# Patient Record
Sex: FEMALE | Race: BLACK OR AFRICAN AMERICAN | NOT HISPANIC OR LATINO | Employment: FULL TIME | URBAN - METROPOLITAN AREA
[De-identification: names, ages, dates, MRNs, and addresses within clinical notes are randomized per-mention and may not be internally consistent; named-entity substitution may affect disease eponyms.]

---

## 2019-10-30 ENCOUNTER — HOSPITAL ENCOUNTER (OUTPATIENT)
Dept: RADIOLOGY | Facility: HOSPITAL | Age: 34
Discharge: HOME | End: 2019-10-30
Attending: FAMILY MEDICINE
Payer: COMMERCIAL

## 2019-10-30 DIAGNOSIS — I26.99 PULMONARY EMBOLISM: ICD-10-CM

## 2019-10-30 PROCEDURE — 25500020 PHARM REV CODE 255: Mod: PO | Performed by: FAMILY MEDICINE

## 2019-10-30 PROCEDURE — 71275 CT ANGIOGRAPHY CHEST: CPT | Mod: 26,,, | Performed by: RADIOLOGY

## 2019-10-30 PROCEDURE — 71275 CT ANGIOGRAPHY CHEST: CPT | Mod: TC,PO

## 2019-10-30 PROCEDURE — 71275 CTA CHEST NON CORONARY: ICD-10-PCS | Mod: 26,,, | Performed by: RADIOLOGY

## 2019-10-30 RX ADMIN — IOHEXOL 100 ML: 350 INJECTION, SOLUTION INTRAVENOUS at 08:10

## 2019-11-08 ENCOUNTER — INITIAL CONSULT (OUTPATIENT)
Dept: HEMATOLOGY/ONCOLOGY | Facility: CLINIC | Age: 34
End: 2019-11-08
Payer: COMMERCIAL

## 2019-11-08 VITALS
OXYGEN SATURATION: 98 % | TEMPERATURE: 98 F | DIASTOLIC BLOOD PRESSURE: 92 MMHG | HEART RATE: 83 BPM | SYSTOLIC BLOOD PRESSURE: 134 MMHG | HEIGHT: 65 IN | BODY MASS INDEX: 46.51 KG/M2 | WEIGHT: 279.13 LBS

## 2019-11-08 DIAGNOSIS — R56.9 SEIZURE: ICD-10-CM

## 2019-11-08 DIAGNOSIS — I26.99 OTHER ACUTE PULMONARY EMBOLISM WITHOUT ACUTE COR PULMONALE: Primary | ICD-10-CM

## 2019-11-08 PROCEDURE — 3008F PR BODY MASS INDEX (BMI) DOCUMENTED: ICD-10-PCS | Mod: CPTII,S$GLB,, | Performed by: INTERNAL MEDICINE

## 2019-11-08 PROCEDURE — 3008F BODY MASS INDEX DOCD: CPT | Mod: CPTII,S$GLB,, | Performed by: INTERNAL MEDICINE

## 2019-11-08 PROCEDURE — 99204 OFFICE O/P NEW MOD 45 MIN: CPT | Mod: S$GLB,,, | Performed by: INTERNAL MEDICINE

## 2019-11-08 PROCEDURE — 99204 PR OFFICE/OUTPT VISIT, NEW, LEVL IV, 45-59 MIN: ICD-10-PCS | Mod: S$GLB,,, | Performed by: INTERNAL MEDICINE

## 2019-11-08 PROCEDURE — 99999 PR PBB SHADOW E&M-EST. PATIENT-LVL III: CPT | Mod: PBBFAC,,, | Performed by: INTERNAL MEDICINE

## 2019-11-08 PROCEDURE — 99999 PR PBB SHADOW E&M-EST. PATIENT-LVL III: ICD-10-PCS | Mod: PBBFAC,,, | Performed by: INTERNAL MEDICINE

## 2019-11-08 RX ORDER — GLUCOSAMINE/CHONDRO SU A 500-400 MG
1 TABLET ORAL DAILY
COMMUNITY
End: 2020-04-17

## 2019-11-08 RX ORDER — ERGOCALCIFEROL 1.25 MG/1
50000 CAPSULE ORAL
COMMUNITY
End: 2022-01-13 | Stop reason: SDUPTHER

## 2019-11-08 RX ORDER — METOPROLOL SUCCINATE 25 MG/1
25 TABLET, EXTENDED RELEASE ORAL DAILY
COMMUNITY
End: 2020-04-17

## 2019-11-08 RX ORDER — EPINEPHRINE 0.22MG
100 AEROSOL WITH ADAPTER (ML) INHALATION DAILY
COMMUNITY

## 2019-11-08 RX ORDER — LOSARTAN POTASSIUM AND HYDROCHLOROTHIAZIDE 12.5; 5 MG/1; MG/1
1 TABLET ORAL DAILY
COMMUNITY
End: 2020-01-03 | Stop reason: SDUPTHER

## 2019-11-08 RX ORDER — ZOLPIDEM TARTRATE 10 MG/1
5 TABLET ORAL NIGHTLY PRN
COMMUNITY
End: 2020-04-17

## 2019-11-08 RX ORDER — ALPHA LIPOIC ACID 100 MG
CAPSULE ORAL 2 TIMES DAILY
COMMUNITY

## 2019-11-08 NOTE — LETTER
November 9, 2019      Jaren Tenorio MD  [address] [facility] [place] [address]           [facility]  [address] [facility] [address]  Phone: [phone]  Fax: [phone]          Patient: Alaina Soria   MR Number: [id number]   YOB: 1985   Date of Visit: 11/8/2019       Dear Dr. Jaren Tenorio:    Thank you for referring Alaina Soria to me for evaluation. Attached you will find relevant portions of my assessment and plan of care.    If you have questions, please do not hesitate to call me. I look forward to following Alaina Soria along with you.    Sincerely,    Wendi Alvarez MD    Enclosure  CC:  No Recipients    If you would like to receive this communication electronically, please contact [email or web address] or [phone] to request more information on [facility] access.    For providers and/or their staff who would like to refer a patient to [facility], please contact us through our one-stop-shop provider referral line, [facility], at [phone].    If you feel you have received this communication in error or would no longer like to receive these types of communications, please e-mail [email or web address]

## 2019-11-08 NOTE — PROGRESS NOTES
Subjective:      DATE OF VISIT: 11/8/19     ?  Patient ID:?Alaina Soria is a 34 y.o. female.?? MR#: [id number]   ?   REFERRING PROVIDER: Jaren Tenorio MD  [address] [place] [address]     ? Primary Care Providers:  Jaren Tenorio MD, MD (General)     CHIEF COMPLAINT: ?  Pulmonary embolism??   ?   HPI    I had the pleasure of meeting Ms. Soria, a 34-year-old woman with hypertension and obesity who presents for further evaluation recommendations after seizure and finding of pulmonary embolism.  She was in her usual state of health until 04/02/2019 with seizure and subsequent fall and facial injury; she presented to emergency room.  She did note shortness of breath on presentation however oxygen saturation within normal limits.  She was discharged however on way out of emergency room she developed a 2nd seizure event.  Due to shortness of breath CT a was performed which revealed small thrombus in segmental right upper lobe pulmonary artery.  She subsequently had DVT scan on 04/03/2019 which was negative.  She has had follow-up with Neurology and discussed likely provoked seizure due to Wellbutrin use which has since been discontinued. She was anticoagulated with Lovenox injection and then transition to apixaban.  Since then she has been continued on apixaban 5 mg b.i.d. without bleeding or bruising issues.  She notes resolution of acute shortness of breath although she does have baseline mild dyspnea on exertion.  She had been on NuvaRing at time of pulmonary embolism and has discontinued without further hormonal use.  She is working on weight loss. She is a never smoker.  She denies history proximal surgery/prolonged immobility prior to event.  She is considering another pregnancy in the next year.    She had had a repeat CTA chest on 11/01/2019 which was technically limited due to body habitus and motion artifact although no remaining pulmonary embolism was seen.    Review  of Systems    ?   A comprehensive 14-point review of systems was reviewed with patient and was negative other than as specified above.   ?   PAST MEDICAL HISTORY:   History reviewed. No pertinent past medical history. ?     PAST SURGICAL HISTORY:   History reviewed. No pertinent surgical history.   ?   ALLERGIES:   Allergies as of 11/08/2019    (No Known Allergies)      ?   MEDICATIONS:?   Outpatient Medications Marked as Taking for the 11/8/19 encounter (Initial consult) with Wendi Alvarez MD   Medication Sig Dispense Refill    alpha lipoic acid 100 mg Cap Take by mouth 2 (two) times daily.      apixaban (ELIQUIS) 5 mg Tab Take 5 mg by mouth 2 (two) times daily.      coenzyme Q10 (CO Q-10) 100 mg capsule Take 100 mg by mouth once daily.      ergocalciferol (ERGOCALCIFEROL) 50,000 unit Cap Take 50,000 Units by mouth every 7 days.      glucosamine-chondroitin 500-400 mg tablet Take 1 tablet by mouth once daily.      losartan-hydrochlorothiazide 50-12.5 mg (HYZAAR) 50-12.5 mg per tablet Take 1 tablet by mouth once daily.      metoprolol succinate (TOPROL XL) 25 MG 24 hr tablet Take 25 mg by mouth once daily.      multivitamin capsule Take 1 capsule by mouth once daily.      vit C/E/zinc/lutein/zeaxanthin (OCUVITE EYE HEALTH ORAL) Take by mouth.      zolpidem (AMBIEN) 10 mg Tab Take 5 mg by mouth nightly as needed.        ?   SOCIAL HISTORY:?   Social History     Tobacco Use    Smoking status: Never Smoker   Substance Use Topics    Alcohol use: Yes     Frequency: 2-4 times a month     Drinks per session: 1 or 2     Binge frequency: Never      ?      ?   FAMILY HISTORY:   family history is not on file.   ?   No family history of thrombosis.  Mother with rheumatoid arthritis, scleroderma and mixed connective tissue disease.      Objective:      Physical Exam      ?   Vitals:    11/08/19 1019   BP: (!) 134/92   Pulse: 83   Temp: 98.3 °F (36.8 °C)      ?   ECOG:?0   General appearance: Generally well  appearing, in no acute distress.   Head, eyes, ears, nose, and throat: Oropharynx clear with moist mucous membranes.   Cardiovascular: Regular rate and rhythm, S1, S2, no audible murmurs.   Respiratory: Lungs clear to auscultation bilaterally.   Abdomen: soft, nontender, nondistended.  Extremities: Warm, without edema.   Neurologic: Alert and oriented. Grossly normal strength, coordination, and gait.   Skin: No rashes, ecchymoses or petechial lesion.   ?      ?   Laboratory:  ?       IMAGING    4/2/19 Chest CTA    Indication: 33-year-old woman with dyspnea and syncope/seizure.    Technique: Thin slice helical CT imaging was performed from above the lung apices through the diaphragm following the administration of 85 mL Omnipaque 350 low osmolar intravenous contrast with coronal and sagittal reformatted images generated from the axial dataset per routine chest CTA protocol. Automated exposure control and/or weight adjusted ma/kV utilized per standard protocol to achieve as low as reasonable radiation dose.    Comparison: None.    Findings:    The main pulmonary arteries and segmental pulmonary arteries are well opacified with contrast with the subsegmental pulmonary arteries suboptimally opacified. There is focal filling defect within a single right upper lobe segmental pulmonary artery, well demonstrated on the coronal MIP series consistent with a small volume of pulmonary thromboembolus. No additional clot is identified within the pulmonary arteries. No findings of right heart strain.. The heart and great vessels are normal in size without pericardial effusion. No coronary artery atherosclerotic calcification is identified. The lungs are well expanded and clear without pulmonary or pleural abnormality. The large central airways are widely patent without filling defect or bronchiectasis. There is no intrathoracic or axillary adenopathy or mass. No significant abnormality at the thoracic inlet. Obesity without acute  chest wall abnormality. Limited arterial phase imaging through the upper abdomen demonstrates no acute pathology. No significant skeletal abnormality.    Impression:    Small volume of thrombus at a single segmental right upper lobe pulmonary artery.  No additional pulmonary thromboembolus or acute pathology identified at the chest.  ?   Assessment/Plan:       1. Other acute pulmonary embolism without acute cor pulmonale          Plan:     # acute pulmonary embolism:  Small thrombus and segmental right upper lobe pulmonary artery on 04/01/2019.  No concomitant DVT.  I discussed with her provoking factor of hormonal use at the time as well as obesity.  As this is her 1st thrombotic event which is provoked 3-6 month course of anticoagulation should be sufficient.  She has now completed 6 months of apixaban which he tolerated well without bleeding complication.  Another provider had ordered a repeat CTA on 11/01/2019 which I reviewed; this was limited with body habitus and motion artifacts however no thrombus was detected.  I have recommended risk factor reduction.  She has discontinued hormones and is working on weight loss. She is a never smoker. She is interested in future pregnancy and I discuss this is a high estrogen state which would put her at risk for recurrent thrombosis.  I recommended inherited thrombophilia testing which may help guide future anticoagulation prophylaxis needs especially in the setting of a future pregnancy.    # seizure:  Unclear if related to pulmonary embolism.  Recommended follow-up neurology.  [place] to be provoked by Wellbutrin which she has discontinued.        Follow-Up:   Three weeks for labs and then 3 weeks later return visit to discuss results of inherited thrombophilia testing.

## 2019-11-29 ENCOUNTER — LAB VISIT (OUTPATIENT)
Dept: LAB | Facility: HOSPITAL | Age: 34
End: 2019-11-29
Attending: INTERNAL MEDICINE
Payer: COMMERCIAL

## 2019-11-29 DIAGNOSIS — I26.99 OTHER ACUTE PULMONARY EMBOLISM WITHOUT ACUTE COR PULMONALE: ICD-10-CM

## 2019-11-29 PROCEDURE — 86146 BETA-2 GLYCOPROTEIN ANTIBODY: CPT

## 2019-11-29 PROCEDURE — 85305 CLOT INHIBIT PROT S TOTAL: CPT

## 2019-11-29 PROCEDURE — 85302 CLOT INHIBIT PROT C ANTIGEN: CPT

## 2019-11-29 PROCEDURE — 85300 ANTITHROMBIN III ACTIVITY: CPT

## 2019-11-29 PROCEDURE — 85613 RUSSELL VIPER VENOM DILUTED: CPT

## 2019-11-29 PROCEDURE — 36415 COLL VENOUS BLD VENIPUNCTURE: CPT

## 2019-11-29 PROCEDURE — 81240 F2 GENE: CPT

## 2019-11-29 PROCEDURE — 86147 CARDIOLIPIN ANTIBODY EA IG: CPT | Mod: 59

## 2019-11-29 PROCEDURE — 85303 CLOT INHIBIT PROT C ACTIVITY: CPT

## 2019-12-02 LAB
AT III ACT/NOR PPP CHRO: 89 % (ref 83–118)
F2 GENE MUT ANL BLD/T: NORMAL

## 2019-12-03 ENCOUNTER — TELEPHONE (OUTPATIENT)
Dept: HEMATOLOGY/ONCOLOGY | Facility: CLINIC | Age: 34
End: 2019-12-03

## 2019-12-03 LAB
B2 GLYCOPROT1 IGA SER QL: <9 SAU
CARDIOLIPIN IGG SER IA-ACNC: 10.15 GPL (ref 0–14.99)
CARDIOLIPIN IGM SER IA-ACNC: <9.4 MPL (ref 0–12.49)
LA PPP-IMP: NEGATIVE

## 2019-12-03 NOTE — TELEPHONE ENCOUNTER
----- Message from Amanda Bowman LPN sent at 12/3/2019  8:36 AM CST -----  Contact: [facility]      ----- Message -----  From: Wendi Alvarez MD  Sent: 11/27/2019   4:23 PM CST  To: Amanda Bowman LPN    Please let me know if there is a specific form needed.  She will need to remain on anticoagulation given recent blood blot. Depending on dental procedure being performed it should be noted that she may have increased risk of bleeding on blood thinning. Unless dental emergency she should not go off of blood thinner.     ----- Message -----  From: Amanda Bowman LPN  Sent: 11/27/2019  11:28 AM CST  To: MD Dr Kim Ramos, pt is reqeusting a clearance for dental work. Please advise   ----- Message -----  From: Shefali Poon  Sent: 11/27/2019   8:35 AM CST  To: Kim GRAHAM Staff    medical clearance needed for dental work faxed to [phone]//ph:[phone]

## 2019-12-04 LAB — PROT C AG ACT/NOR PPP IA: 81 % (ref 70–150)

## 2019-12-06 LAB
APTT PROTEIN C ACTIVATOR+FV DP/APTT PPP: 85 % (ref 70–140)
PROT S ACT/NOR PPP: 83 % (ref 70–140)

## 2020-01-16 PROBLEM — I26.99 OTHER PULMONARY EMBOLISM WITHOUT ACUTE COR PULMONALE: Status: ACTIVE | Noted: 2019-04-02

## 2020-01-16 PROBLEM — E78.5 HYPERLIPIDEMIA: Status: ACTIVE | Noted: 2020-01-16

## 2020-01-16 PROBLEM — R56.9 SEIZURES: Status: ACTIVE | Noted: 2020-01-16

## 2020-01-16 PROBLEM — I10 ESSENTIAL HYPERTENSION: Status: ACTIVE | Noted: 2019-04-02

## 2020-01-16 PROBLEM — E66.01 MORBID OBESITY: Status: ACTIVE | Noted: 2019-04-02

## 2020-04-17 PROBLEM — J30.9 MILD ALLERGIC RHINITIS: Status: ACTIVE | Noted: 2020-04-17

## 2020-06-05 ENCOUNTER — HOSPITAL ENCOUNTER (EMERGENCY)
Facility: HOSPITAL | Age: 35
Discharge: HOME | End: 2020-06-05
Attending: EMERGENCY MEDICINE
Payer: MEDICAID

## 2020-06-05 VITALS
WEIGHT: 262.88 LBS | RESPIRATION RATE: 17 BRPM | BODY MASS INDEX: 43.75 KG/M2 | HEART RATE: 69 BPM | TEMPERATURE: 98 F | DIASTOLIC BLOOD PRESSURE: 59 MMHG | SYSTOLIC BLOOD PRESSURE: 112 MMHG | OXYGEN SATURATION: 100 %

## 2020-06-05 DIAGNOSIS — R07.89 CHEST WALL PAIN: Primary | ICD-10-CM

## 2020-06-05 DIAGNOSIS — R07.9 CHEST PAIN: ICD-10-CM

## 2020-06-05 LAB
ALBUMIN SERPL BCP-MCNC: 3.6 G/DL (ref 3.5–5.2)
ALP SERPL-CCNC: 79 U/L (ref 55–135)
ALT SERPL W/O P-5'-P-CCNC: 23 U/L (ref 10–44)
ANION GAP SERPL CALC-SCNC: 9 MMOL/L (ref 8–16)
AST SERPL-CCNC: 33 U/L (ref 10–40)
B-HCG UR QL: NEGATIVE
BACTERIA #/AREA URNS AUTO: NORMAL /HPF
BASOPHILS # BLD AUTO: 0.04 K/UL (ref 0–0.2)
BASOPHILS NFR BLD: 0.6 % (ref 0–1.9)
BILIRUB SERPL-MCNC: 0.3 MG/DL (ref 0.1–1)
BILIRUB UR QL STRIP: NEGATIVE
BNP SERPL-MCNC: 85 PG/ML (ref 0–99)
BUN SERPL-MCNC: 8 MG/DL (ref 6–20)
CALCIUM SERPL-MCNC: 8.6 MG/DL (ref 8.7–10.5)
CHLORIDE SERPL-SCNC: 104 MMOL/L (ref 95–110)
CLARITY UR REFRACT.AUTO: ABNORMAL
CO2 SERPL-SCNC: 25 MMOL/L (ref 23–29)
COLOR UR AUTO: YELLOW
CREAT SERPL-MCNC: 0.9 MG/DL (ref 0.5–1.4)
DIFFERENTIAL METHOD: ABNORMAL
EOSINOPHIL # BLD AUTO: 0.1 K/UL (ref 0–0.5)
EOSINOPHIL NFR BLD: 0.9 % (ref 0–8)
ERYTHROCYTE [DISTWIDTH] IN BLOOD BY AUTOMATED COUNT: 12.6 % (ref 11.5–14.5)
EST. GFR  (AFRICAN AMERICAN): >60 ML/MIN/1.73 M^2
EST. GFR  (NON AFRICAN AMERICAN): >60 ML/MIN/1.73 M^2
GLUCOSE SERPL-MCNC: 92 MG/DL (ref 70–110)
GLUCOSE UR QL STRIP: NEGATIVE
HCT VFR BLD AUTO: 39.4 % (ref 37–48.5)
HGB BLD-MCNC: 12.4 G/DL (ref 12–16)
HGB UR QL STRIP: ABNORMAL
IMM GRANULOCYTES # BLD AUTO: 0.02 K/UL (ref 0–0.04)
IMM GRANULOCYTES NFR BLD AUTO: 0.3 % (ref 0–0.5)
KETONES UR QL STRIP: NEGATIVE
LEUKOCYTE ESTERASE UR QL STRIP: NEGATIVE
LYMPHOCYTES # BLD AUTO: 2.6 K/UL (ref 1–4.8)
LYMPHOCYTES NFR BLD: 40.8 % (ref 18–48)
MCH RBC QN AUTO: 28.5 PG (ref 27–31)
MCHC RBC AUTO-ENTMCNC: 31.5 G/DL (ref 32–36)
MCV RBC AUTO: 91 FL (ref 82–98)
MICROSCOPIC COMMENT: NORMAL
MONOCYTES # BLD AUTO: 0.5 K/UL (ref 0.3–1)
MONOCYTES NFR BLD: 8.1 % (ref 4–15)
NEUTROPHILS # BLD AUTO: 3.2 K/UL (ref 1.8–7.7)
NEUTROPHILS NFR BLD: 49.3 % (ref 38–73)
NITRITE UR QL STRIP: NEGATIVE
NRBC BLD-RTO: 0 /100 WBC
PH UR STRIP: 6 [PH] (ref 5–8)
PLATELET # BLD AUTO: 245 K/UL (ref 150–350)
PMV BLD AUTO: 11 FL (ref 9.2–12.9)
POTASSIUM SERPL-SCNC: 4.8 MMOL/L (ref 3.5–5.1)
PROT SERPL-MCNC: 7.8 G/DL (ref 6–8.4)
PROT UR QL STRIP: NEGATIVE
RBC # BLD AUTO: 4.35 M/UL (ref 4–5.4)
RBC #/AREA URNS AUTO: 3 /HPF (ref 0–4)
SARS-COV-2 RDRP RESP QL NAA+PROBE: NEGATIVE
SODIUM SERPL-SCNC: 138 MMOL/L (ref 136–145)
SP GR UR STRIP: >=1.03 (ref 1–1.03)
SQUAMOUS #/AREA URNS AUTO: 8 /HPF
TROPONIN I SERPL DL<=0.01 NG/ML-MCNC: <0.006 NG/ML (ref 0–0.03)
URN SPEC COLLECT METH UR: ABNORMAL
UROBILINOGEN UR STRIP-ACNC: NEGATIVE EU/DL
WBC # BLD AUTO: 6.42 K/UL (ref 3.9–12.7)
WBC #/AREA URNS AUTO: 3 /HPF (ref 0–5)

## 2020-06-05 PROCEDURE — 81000 URINALYSIS NONAUTO W/SCOPE: CPT | Mod: ER

## 2020-06-05 PROCEDURE — 83880 ASSAY OF NATRIURETIC PEPTIDE: CPT | Mod: ER

## 2020-06-05 PROCEDURE — 99285 EMERGENCY DEPT VISIT HI MDM: CPT | Mod: 25,ER

## 2020-06-05 PROCEDURE — 93010 ELECTROCARDIOGRAM REPORT: CPT | Mod: ,,, | Performed by: INTERNAL MEDICINE

## 2020-06-05 PROCEDURE — 25000003 PHARM REV CODE 250: Mod: ER | Performed by: EMERGENCY MEDICINE

## 2020-06-05 PROCEDURE — 93005 ELECTROCARDIOGRAM TRACING: CPT | Mod: ER

## 2020-06-05 PROCEDURE — 80053 COMPREHEN METABOLIC PANEL: CPT | Mod: ER

## 2020-06-05 PROCEDURE — 85025 COMPLETE CBC W/AUTO DIFF WBC: CPT | Mod: ER

## 2020-06-05 PROCEDURE — U0002 COVID-19 LAB TEST NON-CDC: HCPCS | Mod: ER

## 2020-06-05 PROCEDURE — 84484 ASSAY OF TROPONIN QUANT: CPT | Mod: ER

## 2020-06-05 PROCEDURE — 93010 EKG 12-LEAD: ICD-10-PCS | Mod: ,,, | Performed by: INTERNAL MEDICINE

## 2020-06-05 PROCEDURE — 81025 URINE PREGNANCY TEST: CPT | Mod: ER

## 2020-06-05 PROCEDURE — 25500020 PHARM REV CODE 255: Mod: ER | Performed by: EMERGENCY MEDICINE

## 2020-06-05 RX ORDER — CYCLOBENZAPRINE HCL 10 MG
10 TABLET ORAL 3 TIMES DAILY PRN
Qty: 30 TABLET | Refills: 0 | Status: SHIPPED | OUTPATIENT
Start: 2020-06-05 | End: 2021-03-24

## 2020-06-05 RX ORDER — CYCLOBENZAPRINE HCL 10 MG
10 TABLET ORAL
Status: COMPLETED | OUTPATIENT
Start: 2020-06-05 | End: 2020-06-05

## 2020-06-05 RX ADMIN — CYCLOBENZAPRINE 10 MG: 10 TABLET, FILM COATED ORAL at 06:06

## 2020-06-05 RX ADMIN — IOHEXOL 100 ML: 350 INJECTION, SOLUTION INTRAVENOUS at 06:06

## 2020-06-05 NOTE — ED PROVIDER NOTES
Encounter Date: 6/5/2020       History     Chief Complaint   Patient presents with    Shortness of Breath     SOB and heart palpitations, onset last night     35 y/o F with PMH of PE, Epilepsy, HTN, Acne here with 1 day of left sided chest pain that does not radiate, worsens with left shoulder motion, and is associated with palpitations. She is currently on Elliquis, last CTA was 6 months ago with only residual PE. Patient thinks this may be MSK as she has been lifting a family member recently, but given her history is concerned about recurrent PE. Denies recent seizures, syncopal episodes, leg swelling.         Review of patient's allergies indicates:  No Known Allergies  Past Medical History:   Diagnosis Date    DVT of proximal leg (deep vein thrombosis)     Epilepsy     Essential hypertension, benign     Fibroids     Mild allergic rhinitis     Pulmonary embolism      History reviewed. No pertinent surgical history.  History reviewed. No pertinent family history.  Social History     Tobacco Use    Smoking status: Never Smoker   Substance Use Topics    Alcohol use: Yes     Frequency: 2-4 times a month     Drinks per session: 1 or 2     Binge frequency: Never    Drug use: Never     Review of Systems   Constitutional: Negative for diaphoresis and fever.   HENT: Negative for congestion, dental problem and sore throat.    Eyes: Negative for pain and visual disturbance.   Respiratory: Positive for shortness of breath. Negative for cough.    Cardiovascular: Positive for chest pain. Negative for palpitations.   Gastrointestinal: Negative for abdominal pain, diarrhea, nausea and vomiting.   Genitourinary: Negative for dysuria and flank pain.   Musculoskeletal: Negative for back pain and neck pain.   Skin: Negative for rash and wound.   Neurological: Negative for weakness, numbness and headaches.   Psychiatric/Behavioral: Negative for agitation and confusion.       Physical Exam     Initial Vitals [06/05/20 1642]    BP Pulse Resp Temp SpO2   128/75 75 18 98.4 °F (36.9 °C) 99 %      MAP       --         Physical Exam    Constitutional: She appears well-developed and well-nourished.   HENT:   Head: Normocephalic and atraumatic.   Eyes: EOM are normal. Pupils are equal, round, and reactive to light.   Neck: Normal range of motion. Neck supple.   Cardiovascular: Normal rate and regular rhythm.   No chest wall tenderness. Roosting crow maneuver reproduces chest pain.    Pulmonary/Chest: Breath sounds normal. No respiratory distress.   Abdominal: She exhibits no distension. There is no tenderness.   Musculoskeletal: She exhibits no edema or tenderness.   Neurological: She is alert and oriented to person, place, and time.   Skin: Skin is warm and dry.   Psychiatric: She has a normal mood and affect.         ED Course   Procedures  Labs Reviewed   CBC W/ AUTO DIFFERENTIAL - Abnormal; Notable for the following components:       Result Value    Mean Corpuscular Hemoglobin Conc 31.5 (*)     All other components within normal limits   COMPREHENSIVE METABOLIC PANEL - Abnormal; Notable for the following components:    Calcium 8.6 (*)     All other components within normal limits   URINALYSIS, REFLEX TO URINE CULTURE - Abnormal; Notable for the following components:    Appearance, UA Hazy (*)     Specific Gravity, UA >=1.030 (*)     Occult Blood UA 1+ (*)     All other components within normal limits    Narrative:     Preferred Collection Type->Urine, Clean Catch   B-TYPE NATRIURETIC PEPTIDE   TROPONIN I   PREGNANCY TEST, URINE RAPID   SARS-COV-2 RNA AMPLIFICATION, QUAL   URINALYSIS MICROSCOPIC    Narrative:     Preferred Collection Type->Urine, Clean Catch     EKG Readings: (Independently Interpreted)   Rate of 68 BPM. NSR. Normal axis. No STEMI. OK, QRS, QTc wnl. Q wave in III represent ischemia vs. right heart strain.      Results for orders placed or performed during the hospital encounter of 06/05/20   CBC auto differential   Result  Value Ref Range    WBC 6.42 3.90 - 12.70 K/uL    RBC 4.35 4.00 - 5.40 M/uL    Hemoglobin 12.4 12.0 - 16.0 g/dL    Hematocrit 39.4 37.0 - 48.5 %    Mean Corpuscular Volume 91 82 - 98 fL    Mean Corpuscular Hemoglobin 28.5 27.0 - 31.0 pg    Mean Corpuscular Hemoglobin Conc 31.5 (L) 32.0 - 36.0 g/dL    RDW 12.6 11.5 - 14.5 %    Platelets 245 150 - 350 K/uL    MPV 11.0 9.2 - 12.9 fL    Immature Granulocytes 0.3 0.0 - 0.5 %    Gran # (ANC) 3.2 1.8 - 7.7 K/uL    Immature Grans (Abs) 0.02 0.00 - 0.04 K/uL    Lymph # 2.6 1.0 - 4.8 K/uL    Mono # 0.5 0.3 - 1.0 K/uL    Eos # 0.1 0.0 - 0.5 K/uL    Baso # 0.04 0.00 - 0.20 K/uL    nRBC 0 0 /100 WBC    Gran% 49.3 38.0 - 73.0 %    Lymph% 40.8 18.0 - 48.0 %    Mono% 8.1 4.0 - 15.0 %    Eosinophil% 0.9 0.0 - 8.0 %    Basophil% 0.6 0.0 - 1.9 %    Differential Method Automated    Comprehensive metabolic panel   Result Value Ref Range    Sodium 138 136 - 145 mmol/L    Potassium 4.8 3.5 - 5.1 mmol/L    Chloride 104 95 - 110 mmol/L    CO2 25 23 - 29 mmol/L    Glucose 92 70 - 110 mg/dL    BUN, Bld 8 6 - 20 mg/dL    Creatinine 0.9 0.5 - 1.4 mg/dL    Calcium 8.6 (L) 8.7 - 10.5 mg/dL    Total Protein 7.8 6.0 - 8.4 g/dL    Albumin 3.6 3.5 - 5.2 g/dL    Total Bilirubin 0.3 0.1 - 1.0 mg/dL    Alkaline Phosphatase 79 55 - 135 U/L    AST 33 10 - 40 U/L    ALT 23 10 - 44 U/L    Anion Gap 9 8 - 16 mmol/L    eGFR if African American >60.0 >60 mL/min/1.73 m^2    eGFR if non African American >60.0 >60 mL/min/1.73 m^2   Brain natriuretic peptide   Result Value Ref Range    BNP 85 0 - 99 pg/mL   Troponin I   Result Value Ref Range    Troponin I <0.006 0.000 - 0.026 ng/mL   Urinalysis, Reflex to Urine Culture Urine, Clean Catch   Result Value Ref Range    Specimen UA Urine, Clean Catch     Color, UA Yellow Yellow, Straw, Shelley    Appearance, UA Hazy (A) Clear    pH, UA 6.0 5.0 - 8.0    Specific Gravity, UA >=1.030 (A) 1.005 - 1.030    Protein, UA Negative Negative    Glucose, UA Negative Negative     Ketones, UA Negative Negative    Bilirubin (UA) Negative Negative    Occult Blood UA 1+ (A) Negative    Nitrite, UA Negative Negative    Urobilinogen, UA Negative <2.0 EU/dL    Leukocytes, UA Negative Negative   Pregnancy, urine rapid   Result Value Ref Range    Preg Test, Ur Negative    COVID-19 Rapid Screening   Result Value Ref Range    SARS-CoV-2 RNA, Amplification, Qual Negative Negative   Urinalysis Microscopic   Result Value Ref Range    RBC, UA 3 0 - 4 /hpf    WBC, UA 3 0 - 5 /hpf    Bacteria Occasional None-Occ /hpf    Squam Epithel, UA 8 /hpf    Microscopic Comment SEE COMMENT          Imaging Results          CTA Chest Non-Coronary (PE Study) (Final result)  Result time 06/05/20 18:24:26    Final result by Alen Hernandez MD (06/05/20 18:24:26)                 Impression:      1.  Negative for acute process involving the chest.  Specifically, negative for pulmonary embolism, aneurysm or dissection.    2.  Stable findings as noted above.    All CT scans at this facility are performed  using dose modulation techniques as appropriate to performed exam including the following:  automated exposure control; adjustment of mA and/or kV according to the patients size (this includes techniques or standardized protocols for targeted exams where dose is matched to indication/reason for exam: i.e. extremities or head);  iterative reconstruction technique.      Electronically signed by: Alen Hernandez MD  Date:    06/05/2020  Time:    18:24             Narrative:    EXAMINATION:  CTA CHEST NON CORONARY, multiplanar and 3D MIP reconstructions    CLINICAL HISTORY:  Chest pain, acute, PE suspected, high pretest prob;    TECHNIQUE:  Axial images through the chest were obtained with the use of IV contrast. Sagittal, coronal and 3D MIP <reconstructions are provided for review and permanently archived.>    COMPARISON:  October 30, 2019    FINDINGS:  The pulmonary vasculature is normal without evidence for pulmonary emboli.   The aorta is intact without evidence for aneurysm or dissection.  There are no hilar or mediastinal masses or lymphadenopathy. There are no infiltrates, effusions, or parenchymal lung nodules.    The airways are patent.  The thyroid gland is normal.  The esophagus is normal.    Accessory spleen inferior to the spleen.  The upper abdominal organs are normal.    Negative for osseous lesions. Mild marginal spondylosis.                               X-Ray Chest AP Portable (Final result)  Result time 06/05/20 18:11:16    Final result by Alen Hernandez MD (06/05/20 18:11:16)                 Impression:      1.  Negative for acute process involving the chest.    2.  Incidental findings as noted above.      Electronically signed by: Alen Hernandez MD  Date:    06/05/2020  Time:    18:11             Narrative:    EXAMINATION:  XR CHEST AP PORTABLE    CLINICAL HISTORY:  Chest pain;    COMPARISON:  No comparison studies are available.    FINDINGS:  EKG leads overlie the chest.  The lungs are clear. The cardiac silhouette size is normal. The trachea is midline and the mediastinal width is normal. Negative for focal infiltrate, effusion or pneumothorax. Pulmonary vasculature is normal. Negative for osseous abnormalities. Convex-left curvature of the thoracic spine.  Tortuous aorta.  Marginal spondylosis.  Cardiophrenic fat pads.                                                   ED Course as of Jun 06 0053   Fri Jun 05, 2020   1751 I, Mayo Cardoza M.D., am handing off care of the patient to oncoming physician: Dr. Bauman.   We have discussed patient history, exam, and workup and treatment thus far, as well as potential disposition options. They understand, and will take over management from here forward.           [BA]   1827 Care accepted from Dr. Hou    [LB]   1843 Patient reports that she started developing left-sided chest discomfort started this morning.  Is aggravated by movement of the left shoulder.  Is not  associated with any nausea, vomiting, diaphoresis.  Patient denies any known history of coronary artery disease.  Patient denies tobacco abuse.  Patient resting does not appear to be any distress.    [LB]      ED Course User Index  [BA] Mayo Cardoza MD  [LB] Britt Bauman, DO             Medications   iohexoL (OMNIPAQUE 350) injection 100 mL (100 mLs Intravenous Given 6/5/20 1814)   cyclobenzaprine tablet 10 mg (10 mg Oral Given 6/5/20 1849)       6:43 PM Reassessment: Dr. Bauman reassessed the pt.  The pt is resting comfortably and is NAD.  Pt states their sx have improved. Discussed test results, shared treatment plan, specific conditions for return, and the need for f/u.  Answered their questions at this time.  Pt understands and agrees to the plan.  The pt has remained hemodynamically stable through ED course and is stable for discharge.    Follow-up Information     [facility] In 2 days.    Specialty:  Emergency Medicine  Why:  Return to emergency department for:  Worsening chest pain, shortness of breath, fever, breaking out in sweats, or other concerns.  Contact information:  [address]  [facility] [address] [phone]                    Medication List      START taking these medications    cyclobenzaprine 10 MG tablet  Commonly known as:  FLEXERIL  Take 1 tablet (10 mg total) by mouth 3 (three) times daily as needed for Muscle spasms.        ASK your doctor about these medications    alpha lipoic acid 100 mg Cap     apixaban 5 mg Tab  Commonly known as:  ELIQUIS  Take 1 tablet (5 mg total) by mouth 2 (two) times daily.     CO Q-10 100 mg capsule  Generic drug:  coenzyme Q10     COZAAR 50 MG tablet  Generic drug:  losartan     doxycycline 100 MG Cap  Commonly known as:  VIBRAMYCIN  Take 1 capsule (100 mg total) by mouth 2 (two) times daily.     ergocalciferol 50,000 unit Cap  Commonly known as:  ERGOCALCIFEROL     levETIRAcetam 1000 MG tablet  Commonly known as:   KEPPRA     metoprolol tartrate 25 MG tablet  Commonly known as:  LOPRESSOR  Take 1 tablet (25 mg total) by mouth 2 (two) times daily.     multivitamin capsule     spironolactone 50 MG tablet  Commonly known as:  ALDACTONE  Take 1 tablet (50 mg total) by mouth once daily.           Where to Get Your Medications      You can get these medications from any pharmacy    Bring a paper prescription for each of these medications  · cyclobenzaprine 10 MG tablet         I discussed with patient and/or family/caretaker that evaluation in the ED does not suggest any emergent or life threatening medical conditions requiring immediate intervention beyond what was provided in the ED, and I believe patient is safe for discharge.  Regardless, an unremarkable evaluation in the ED does not preclude the development or presence of a serious of life threatening condition. As such, patient was instructed to return immediately for any worsening or change in current symptoms.    I have discussed with patient and/or family/caretaker chest pain precautions, specifically to return for worsening chest pain, shortness of breath, fever, or any concern.  I have low suspicion for cardiopulmonary, vascular, infectious, respiratory, or other emergent medical condition based on my evaluation in the ED.        Clinical Impression:       ICD-10-CM ICD-9-CM   1. Chest wall pain R07.89 786.52   2. Chest pain R07.9 786.50           Disposition:   Disposition: Discharged  Condition: Stable     ED Disposition Condition    Discharge Stable        ED Prescriptions     Medication Sig Dispense Start Date End Date Auth. Provider    cyclobenzaprine (FLEXERIL) 10 MG tablet Take 1 tablet (10 mg total) by mouth 3 (three) times daily as needed for Muscle spasms. 30 tablet 6/5/2020  Britt Bauman, DO        Follow-up Information     Follow up With Specialties Details Why Contact Info    [facility] In 2 days Return to emergency  department for:  Worsening chest pain, shortness of breath, fever, breaking out in sweats, or other concerns. [address] [address]  [facility] [id number] [phone]                                     Britt Bauman DO  06/06/20 0054

## 2021-03-24 ENCOUNTER — LAB VISIT (OUTPATIENT)
Dept: LAB | Facility: HOSPITAL | Age: 36
End: 2021-03-24
Attending: INTERNAL MEDICINE
Payer: COMMERCIAL

## 2021-03-24 ENCOUNTER — OFFICE VISIT (OUTPATIENT)
Dept: HEMATOLOGY/ONCOLOGY | Facility: CLINIC | Age: 36
End: 2021-03-24
Payer: COMMERCIAL

## 2021-03-24 VITALS
OXYGEN SATURATION: 99 % | BODY MASS INDEX: 46.82 KG/M2 | WEIGHT: 274.25 LBS | DIASTOLIC BLOOD PRESSURE: 76 MMHG | RESPIRATION RATE: 17 BRPM | SYSTOLIC BLOOD PRESSURE: 102 MMHG | HEART RATE: 83 BPM | HEIGHT: 64 IN | TEMPERATURE: 97 F

## 2021-03-24 DIAGNOSIS — I26.99 OTHER ACUTE PULMONARY EMBOLISM WITHOUT ACUTE COR PULMONALE: ICD-10-CM

## 2021-03-24 DIAGNOSIS — I26.99 OTHER ACUTE PULMONARY EMBOLISM WITHOUT ACUTE COR PULMONALE: Primary | ICD-10-CM

## 2021-03-24 PROCEDURE — 99213 PR OFFICE/OUTPT VISIT, EST, LEVL III, 20-29 MIN: ICD-10-PCS | Mod: S$GLB,,, | Performed by: INTERNAL MEDICINE

## 2021-03-24 PROCEDURE — 81241 F5 GENE: CPT | Performed by: INTERNAL MEDICINE

## 2021-03-24 PROCEDURE — 1126F AMNT PAIN NOTED NONE PRSNT: CPT | Mod: S$GLB,,, | Performed by: INTERNAL MEDICINE

## 2021-03-24 PROCEDURE — 3078F DIAST BP <80 MM HG: CPT | Mod: CPTII,S$GLB,, | Performed by: INTERNAL MEDICINE

## 2021-03-24 PROCEDURE — 99213 OFFICE O/P EST LOW 20 MIN: CPT | Mod: S$GLB,,, | Performed by: INTERNAL MEDICINE

## 2021-03-24 PROCEDURE — 1126F PR PAIN SEVERITY QUANTIFIED, NO PAIN PRESENT: ICD-10-PCS | Mod: S$GLB,,, | Performed by: INTERNAL MEDICINE

## 2021-03-24 PROCEDURE — 3008F PR BODY MASS INDEX (BMI) DOCUMENTED: ICD-10-PCS | Mod: CPTII,S$GLB,, | Performed by: INTERNAL MEDICINE

## 2021-03-24 PROCEDURE — 3078F PR MOST RECENT DIASTOLIC BLOOD PRESSURE < 80 MM HG: ICD-10-PCS | Mod: CPTII,S$GLB,, | Performed by: INTERNAL MEDICINE

## 2021-03-24 PROCEDURE — 99999 PR PBB SHADOW E&M-EST. PATIENT-LVL III: ICD-10-PCS | Mod: PBBFAC,,, | Performed by: INTERNAL MEDICINE

## 2021-03-24 PROCEDURE — 3074F PR MOST RECENT SYSTOLIC BLOOD PRESSURE < 130 MM HG: ICD-10-PCS | Mod: CPTII,S$GLB,, | Performed by: INTERNAL MEDICINE

## 2021-03-24 PROCEDURE — 99999 PR PBB SHADOW E&M-EST. PATIENT-LVL III: CPT | Mod: PBBFAC,,, | Performed by: INTERNAL MEDICINE

## 2021-03-24 PROCEDURE — 3008F BODY MASS INDEX DOCD: CPT | Mod: CPTII,S$GLB,, | Performed by: INTERNAL MEDICINE

## 2021-03-24 PROCEDURE — 3074F SYST BP LT 130 MM HG: CPT | Mod: CPTII,S$GLB,, | Performed by: INTERNAL MEDICINE

## 2021-03-24 PROCEDURE — 36415 COLL VENOUS BLD VENIPUNCTURE: CPT | Performed by: INTERNAL MEDICINE

## 2021-03-30 LAB
F5 GENE MUT ANL BLD/T: NORMAL
F5 P.R506Q BLD/T QL: NORMAL

## 2021-04-14 ENCOUNTER — PATIENT MESSAGE (OUTPATIENT)
Dept: HEMATOLOGY/ONCOLOGY | Facility: CLINIC | Age: 36
End: 2021-04-14

## 2021-04-15 DIAGNOSIS — I26.99 OTHER ACUTE PULMONARY EMBOLISM WITHOUT ACUTE COR PULMONALE: Primary | ICD-10-CM

## 2022-07-30 ENCOUNTER — PATIENT MESSAGE (OUTPATIENT)
Dept: HEMATOLOGY/ONCOLOGY | Facility: CLINIC | Age: 37
End: 2022-07-30
Payer: COMMERCIAL

## 2022-08-12 ENCOUNTER — TELEPHONE (OUTPATIENT)
Dept: HEMATOLOGY/ONCOLOGY | Facility: CLINIC | Age: 37
End: 2022-08-12
Payer: COMMERCIAL

## 2022-08-12 NOTE — TELEPHONE ENCOUNTER
Nurse called [facility] @ [phone], d/t receiving an incompete clearance through fax. Nurse notified that the paper was not complete and to fax over again, nurse gave good fax number for dental to fax over.